# Patient Record
Sex: FEMALE | URBAN - METROPOLITAN AREA
[De-identification: names, ages, dates, MRNs, and addresses within clinical notes are randomized per-mention and may not be internally consistent; named-entity substitution may affect disease eponyms.]

---

## 2024-03-12 ENCOUNTER — OV NP (OUTPATIENT)
Dept: URBAN - METROPOLITAN AREA CLINIC 107 | Facility: CLINIC | Age: 59
End: 2024-03-12
Payer: COMMERCIAL

## 2024-03-12 VITALS
WEIGHT: 285.2 LBS | SYSTOLIC BLOOD PRESSURE: 145 MMHG | TEMPERATURE: 97.3 F | HEART RATE: 70 BPM | BODY MASS INDEX: 42.24 KG/M2 | HEIGHT: 69 IN | DIASTOLIC BLOOD PRESSURE: 91 MMHG

## 2024-03-12 DIAGNOSIS — Z86.010 HISTORY OF ADENOMATOUS POLYP OF COLON: ICD-10-CM

## 2024-03-12 DIAGNOSIS — K57.30 COLON, DIVERTICULOSIS: ICD-10-CM

## 2024-03-12 DIAGNOSIS — Z83.719 FAMILY HISTORY OF COLONIC POLYPS: ICD-10-CM

## 2024-03-12 PROBLEM — 429047008: Status: ACTIVE | Noted: 2024-03-12

## 2024-03-12 PROCEDURE — 99203 OFFICE O/P NEW LOW 30 MIN: CPT | Performed by: NURSE PRACTITIONER

## 2024-03-12 RX ORDER — SEMAGLUTIDE 0.25 MG/.5ML
INJECTION, SOLUTION SUBCUTANEOUS
Qty: 2 MILLILITER | Status: ON HOLD | COMMUNITY

## 2024-03-12 RX ORDER — HYDROCHLOROTHIAZIDE 25 MG/1
TABLET ORAL
Qty: 90 TABLET | Status: ACTIVE | COMMUNITY

## 2024-03-12 RX ORDER — SODIUM PICOSULFATE, MAGNESIUM OXIDE, AND ANHYDROUS CITRIC ACID 12; 3.5; 1 G/175ML; G/175ML; MG/175ML
175 ML THE FIRST DOSE THE EVENING BEFORE AND SECOND DOSE THE MORNING OF COLONOSCOPY LIQUID ORAL ONCE A DAY
Qty: 350 | OUTPATIENT
Start: 2024-03-12 | End: 2024-03-14

## 2024-03-12 RX ORDER — AMLODIPINE BESYLATE 2.5 MG/1
TABLET ORAL
Qty: 90 TABLET | Status: ACTIVE | COMMUNITY

## 2024-03-12 RX ORDER — METOPROLOL SUCCINATE ER TABLETS 50 MG/1
TAKE ONE TABLET BY MOUTH ONE TIME DAILY TABLET, FILM COATED, EXTENDED RELEASE ORAL
Qty: 90 UNSPECIFIED | Refills: 0 | Status: ACTIVE | COMMUNITY

## 2024-03-12 RX ORDER — ANASTROZOLE 1 MG/1
TAKE ONE TABLET BY MOUTH ONE TIME DAILY TABLET ORAL
Qty: 90 UNSPECIFIED | Refills: 2 | Status: ACTIVE | COMMUNITY

## 2024-03-12 NOTE — HPI-TODAY'S VISIT:
This is a 58-year-old female with a maternal history of colon cancer and a personal history of hypertension, and breast cancer presenting to schedule colonoscopy. Colonoscopy 4/14/2021:No evidence of residual polyp in the cecum.  Removal of a 2.5 mm transverse polyp.  Tissue was not retrieved.  Repeat colonoscopy recommended in 2024. Colonoscopy 6/6/2019:Removal of 6 polyps including a 7 and a 10 mm polyp.  Pathology: Cecal polypectomy (10 mm): Tubulovillous adenoma.  2 cecal polyps tubular adenomas.  Ascending polyps tubular adenomas.  Transverse polyps tubular adenomas.  All were negative for high-grade dysplasia.  Follow-up recommended in 3 years. Her mother had a partial colon resection due to a polyp that contained cancer.  She reports prior colonoscopies have demonstrated diverticulosis and precancerous polyps.  She is due surveillance.  Her  is in the care of Dr. Mascorro. She denies constipation, red blood per rectum, abdominal pain, or any other abdominal symptoms.

## 2024-03-13 PROBLEM — 429969003: Status: ACTIVE | Noted: 2024-03-13

## 2024-03-16 PROBLEM — 733657002: Status: ACTIVE | Noted: 2024-03-16

## 2024-03-21 ENCOUNTER — OV NP (OUTPATIENT)
Dept: URBAN - METROPOLITAN AREA CLINIC 113 | Facility: CLINIC | Age: 59
End: 2024-03-21

## 2024-03-21 ENCOUNTER — LAB (OUTPATIENT)
Dept: URBAN - METROPOLITAN AREA CLINIC 107 | Facility: CLINIC | Age: 59
End: 2024-03-21

## 2024-06-03 ENCOUNTER — OFFICE VISIT (OUTPATIENT)
Dept: URBAN - METROPOLITAN AREA SURGERY CENTER 25 | Facility: SURGERY CENTER | Age: 59
End: 2024-06-03

## 2024-07-16 ENCOUNTER — CLAIMS CREATED FROM THE CLAIM WINDOW (OUTPATIENT)
Dept: URBAN - METROPOLITAN AREA SURGERY CENTER 25 | Facility: SURGERY CENTER | Age: 59
End: 2024-07-16
Payer: COMMERCIAL

## 2024-07-16 ENCOUNTER — CLAIMS CREATED FROM THE CLAIM WINDOW (OUTPATIENT)
Dept: URBAN - METROPOLITAN AREA CLINIC 4 | Facility: CLINIC | Age: 59
End: 2024-07-16
Payer: COMMERCIAL

## 2024-07-16 DIAGNOSIS — K63.5 BENIGN COLON POLYPS: ICD-10-CM

## 2024-07-16 DIAGNOSIS — Z86.010 ADENOMAS PERSONAL HISTORY OF COLONIC POLYPS: ICD-10-CM

## 2024-07-16 DIAGNOSIS — Z80.0 BROTHER AT YOUNG AGE FAMILY HISTORY OF COLON CANCER: ICD-10-CM

## 2024-07-16 DIAGNOSIS — K64.0 FIRST DEGREE HEMORRHOIDS: ICD-10-CM

## 2024-07-16 DIAGNOSIS — Z12.11 COLON CANCER SCREENING (HIGH RISK): ICD-10-CM

## 2024-07-16 DIAGNOSIS — K63.5 BENIGN COLON POLYP: ICD-10-CM

## 2024-07-16 DIAGNOSIS — K63.5 POLYP OF COLON: ICD-10-CM

## 2024-07-16 PROCEDURE — 00812 ANES LWR INTST SCR COLSC: CPT | Performed by: ANESTHESIOLOGY

## 2024-07-16 PROCEDURE — 45385 COLONOSCOPY W/LESION REMOVAL: CPT | Performed by: INTERNAL MEDICINE

## 2024-07-16 PROCEDURE — 0529F INTRVL 3/>YR PTS CLNSCP DOCD: CPT | Performed by: INTERNAL MEDICINE

## 2024-07-16 PROCEDURE — 88305 TISSUE EXAM BY PATHOLOGIST: CPT | Performed by: PATHOLOGY

## 2024-07-16 PROCEDURE — 00812 ANES LWR INTST SCR COLSC: CPT | Performed by: NURSE ANESTHETIST, CERTIFIED REGISTERED

## 2024-07-16 RX ORDER — SEMAGLUTIDE 0.25 MG/.5ML
INJECTION, SOLUTION SUBCUTANEOUS
Qty: 2 MILLILITER | Status: ON HOLD | COMMUNITY

## 2024-07-16 RX ORDER — ANASTROZOLE 1 MG/1
TAKE ONE TABLET BY MOUTH ONE TIME DAILY TABLET ORAL
Qty: 90 UNSPECIFIED | Refills: 2 | Status: ACTIVE | COMMUNITY

## 2024-07-16 RX ORDER — AMLODIPINE BESYLATE 2.5 MG/1
TABLET ORAL
Qty: 90 TABLET | Status: ACTIVE | COMMUNITY

## 2024-07-16 RX ORDER — HYDROCHLOROTHIAZIDE 25 MG/1
TABLET ORAL
Qty: 90 TABLET | Status: ACTIVE | COMMUNITY

## 2024-07-16 RX ORDER — METOPROLOL SUCCINATE ER TABLETS 50 MG/1
TAKE ONE TABLET BY MOUTH ONE TIME DAILY TABLET, FILM COATED, EXTENDED RELEASE ORAL
Qty: 90 UNSPECIFIED | Refills: 0 | Status: ACTIVE | COMMUNITY

## 2024-07-23 ENCOUNTER — DASHBOARD ENCOUNTERS (OUTPATIENT)
Age: 59
End: 2024-07-23

## 2024-07-23 ENCOUNTER — OFFICE VISIT (OUTPATIENT)
Dept: URBAN - METROPOLITAN AREA CLINIC 107 | Facility: CLINIC | Age: 59
End: 2024-07-23
Payer: COMMERCIAL

## 2024-07-23 VITALS
DIASTOLIC BLOOD PRESSURE: 72 MMHG | HEIGHT: 69 IN | OXYGEN SATURATION: 97 % | WEIGHT: 279.2 LBS | SYSTOLIC BLOOD PRESSURE: 128 MMHG | BODY MASS INDEX: 41.35 KG/M2 | TEMPERATURE: 97.9 F | HEART RATE: 77 BPM | RESPIRATION RATE: 18 BRPM

## 2024-07-23 DIAGNOSIS — Z86.010 HISTORY OF ADENOMATOUS POLYP OF COLON: ICD-10-CM

## 2024-07-23 DIAGNOSIS — Z83.719 FAMILY HISTORY OF COLONIC POLYPS: ICD-10-CM

## 2024-07-23 DIAGNOSIS — K64.8 INTERNAL HEMORRHOIDS: ICD-10-CM

## 2024-07-23 PROCEDURE — 99213 OFFICE O/P EST LOW 20 MIN: CPT | Performed by: NURSE PRACTITIONER

## 2024-07-23 RX ORDER — AMLODIPINE BESYLATE 2.5 MG/1
TABLET ORAL
Qty: 90 TABLET | Status: ACTIVE | COMMUNITY

## 2024-07-23 RX ORDER — METOPROLOL SUCCINATE ER TABLETS 50 MG/1
TAKE ONE TABLET BY MOUTH ONE TIME DAILY TABLET, FILM COATED, EXTENDED RELEASE ORAL
Qty: 90 UNSPECIFIED | Refills: 0 | Status: ACTIVE | COMMUNITY

## 2024-07-23 RX ORDER — TIRZEPATIDE 5 MG/.5ML
0.5 ML INJECTION, SOLUTION SUBCUTANEOUS
Status: ACTIVE | COMMUNITY

## 2024-07-23 RX ORDER — ANASTROZOLE 1 MG/1
TAKE ONE TABLET BY MOUTH ONE TIME DAILY TABLET ORAL
Qty: 90 UNSPECIFIED | Refills: 2 | Status: ACTIVE | COMMUNITY

## 2024-07-23 RX ORDER — HYDROCHLOROTHIAZIDE 25 MG/1
TABLET ORAL
Qty: 90 TABLET | Status: ACTIVE | COMMUNITY

## 2024-07-23 RX ORDER — SEMAGLUTIDE 0.25 MG/.5ML
INJECTION, SOLUTION SUBCUTANEOUS
Qty: 2 MILLILITER | Status: DISCONTINUED | COMMUNITY

## 2024-07-23 NOTE — HPI-TODAY'S VISIT:
This is a 58-year-old female with a maternal history of colon cancer and a personal history of hypertension, and breast cancer here for colonoscopy follow-up.    Colonoscopy 7/16/2024. BBPS score of 8.  Small 4 to 6 mm polyp in the transverse colon which was removed.  Grade 1 internal hemorrhoids.  Polyp was hyperplastic.   Colonoscopy 4/14/2021:No evidence of residual polyp in the cecum.  Removal of a 2.5 mm transverse polyp.  Tissue was not retrieved.  Repeat colonoscopy recommended in 2024. Colonoscopy 6/6/2019:Removal of 6 polyps including a 7 and a 10 mm polyp.  Pathology: Cecal polypectomy (10 mm): Tubulovillous adenoma.  2 cecal polyps tubular adenomas.  Ascending polyps tubular adenomas.  Transverse polyps tubular adenomas.  All were negative for high-grade dysplasia.  Follow-up recommended in 3 years. Her mother had a partial colon resection due to a polyp that contained cancer.  She reports prior colonoscopies have demonstrated diverticulosis and precancerous polyps.  She is due surveillance.  Her  is in the care of Dr. Mascorro. She denies constipation, red blood per rectum, abdominal pain, or any other abdominal symptoms. Rare rectal discomfort, uses prep-H wipes as needed.